# Patient Record
Sex: FEMALE | Race: BLACK OR AFRICAN AMERICAN | HISPANIC OR LATINO
[De-identification: names, ages, dates, MRNs, and addresses within clinical notes are randomized per-mention and may not be internally consistent; named-entity substitution may affect disease eponyms.]

---

## 2019-09-06 ENCOUNTER — APPOINTMENT (OUTPATIENT)
Dept: ELECTROPHYSIOLOGY | Facility: CLINIC | Age: 59
End: 2019-09-06
Payer: MEDICAID

## 2019-09-06 VITALS
BODY MASS INDEX: 33.42 KG/M2 | OXYGEN SATURATION: 98 % | SYSTOLIC BLOOD PRESSURE: 122 MMHG | HEART RATE: 80 BPM | WEIGHT: 203 LBS | DIASTOLIC BLOOD PRESSURE: 80 MMHG | HEIGHT: 65.5 IN

## 2019-09-06 PROCEDURE — 93291 INTERROG DEV EVAL SCRMS IP: CPT

## 2019-09-06 RX ORDER — CHLORHEXIDINE GLUCONATE 4 %
400 (240 MG) LIQUID (ML) TOPICAL
Refills: 0 | Status: ACTIVE | COMMUNITY
Start: 2019-09-06

## 2019-09-06 RX ORDER — SENNA 8.6 MG/1
8.6 TABLET, FILM COATED ORAL
Refills: 0 | Status: ACTIVE | COMMUNITY
Start: 2019-09-06

## 2019-09-06 RX ORDER — RANITIDINE 150 MG/1
150 TABLET ORAL
Refills: 0 | Status: ACTIVE | COMMUNITY
Start: 2019-09-06

## 2019-09-06 RX ORDER — AMLODIPINE BESYLATE 5 MG/1
5 TABLET ORAL DAILY
Qty: 30 | Refills: 0 | Status: ACTIVE | COMMUNITY
Start: 2019-09-06

## 2019-09-06 RX ORDER — CLOPIDOGREL BISULFATE 75 MG/1
75 TABLET, FILM COATED ORAL DAILY
Qty: 90 | Refills: 3 | Status: ACTIVE | COMMUNITY
Start: 2019-09-06

## 2019-09-06 RX ORDER — ATORVASTATIN CALCIUM 20 MG/1
20 TABLET, FILM COATED ORAL
Qty: 30 | Refills: 0 | Status: ACTIVE | COMMUNITY
Start: 2019-09-06

## 2019-11-21 ENCOUNTER — APPOINTMENT (OUTPATIENT)
Dept: ELECTROPHYSIOLOGY | Facility: CLINIC | Age: 59
End: 2019-11-21
Payer: MEDICAID

## 2019-11-21 PROCEDURE — 93298 REM INTERROG DEV EVAL SCRMS: CPT

## 2019-11-21 PROCEDURE — 93299: CPT

## 2019-12-23 ENCOUNTER — APPOINTMENT (OUTPATIENT)
Dept: ELECTROPHYSIOLOGY | Facility: CLINIC | Age: 59
End: 2019-12-23
Payer: MEDICAID

## 2019-12-23 PROCEDURE — 93299: CPT

## 2019-12-23 PROCEDURE — 93298 REM INTERROG DEV EVAL SCRMS: CPT

## 2020-01-23 ENCOUNTER — APPOINTMENT (OUTPATIENT)
Dept: ELECTROPHYSIOLOGY | Facility: CLINIC | Age: 60
End: 2020-01-23
Payer: MEDICAID

## 2020-01-23 PROCEDURE — 93298 REM INTERROG DEV EVAL SCRMS: CPT

## 2020-01-23 PROCEDURE — G2066: CPT

## 2020-02-24 ENCOUNTER — APPOINTMENT (OUTPATIENT)
Dept: ELECTROPHYSIOLOGY | Facility: CLINIC | Age: 60
End: 2020-02-24
Payer: MEDICAID

## 2020-02-24 PROCEDURE — 93298 REM INTERROG DEV EVAL SCRMS: CPT

## 2020-02-24 PROCEDURE — G2066: CPT

## 2020-03-13 ENCOUNTER — APPOINTMENT (OUTPATIENT)
Dept: ELECTROPHYSIOLOGY | Facility: CLINIC | Age: 60
End: 2020-03-13

## 2020-03-27 ENCOUNTER — APPOINTMENT (OUTPATIENT)
Dept: ELECTROPHYSIOLOGY | Facility: CLINIC | Age: 60
End: 2020-03-27
Payer: MEDICAID

## 2020-03-27 PROCEDURE — 93298 REM INTERROG DEV EVAL SCRMS: CPT

## 2020-03-27 PROCEDURE — G2066: CPT

## 2020-04-13 ENCOUNTER — APPOINTMENT (OUTPATIENT)
Dept: ELECTROPHYSIOLOGY | Facility: CLINIC | Age: 60
End: 2020-04-13
Payer: MEDICAID

## 2020-04-13 PROCEDURE — 93298 REM INTERROG DEV EVAL SCRMS: CPT

## 2020-04-13 PROCEDURE — G2066: CPT

## 2020-04-27 ENCOUNTER — APPOINTMENT (OUTPATIENT)
Dept: ELECTROPHYSIOLOGY | Facility: CLINIC | Age: 60
End: 2020-04-27

## 2020-05-14 ENCOUNTER — APPOINTMENT (OUTPATIENT)
Dept: ELECTROPHYSIOLOGY | Facility: CLINIC | Age: 60
End: 2020-05-14
Payer: MEDICAID

## 2020-05-14 PROCEDURE — 93298 REM INTERROG DEV EVAL SCRMS: CPT

## 2020-05-14 PROCEDURE — G2066: CPT

## 2020-06-15 ENCOUNTER — APPOINTMENT (OUTPATIENT)
Dept: ELECTROPHYSIOLOGY | Facility: CLINIC | Age: 60
End: 2020-06-15
Payer: MEDICAID

## 2020-06-15 PROCEDURE — G2066: CPT

## 2020-06-15 PROCEDURE — 93298 REM INTERROG DEV EVAL SCRMS: CPT

## 2020-07-20 ENCOUNTER — APPOINTMENT (OUTPATIENT)
Dept: ELECTROPHYSIOLOGY | Facility: CLINIC | Age: 60
End: 2020-07-20
Payer: MEDICAID

## 2020-07-20 PROCEDURE — G2066: CPT

## 2020-07-20 PROCEDURE — 93298 REM INTERROG DEV EVAL SCRMS: CPT

## 2020-09-03 ENCOUNTER — FORM ENCOUNTER (OUTPATIENT)
Age: 60
End: 2020-09-03

## 2020-09-16 ENCOUNTER — APPOINTMENT (OUTPATIENT)
Dept: ELECTROPHYSIOLOGY | Facility: CLINIC | Age: 60
End: 2020-09-16

## 2020-09-29 ENCOUNTER — APPOINTMENT (OUTPATIENT)
Dept: ELECTROPHYSIOLOGY | Facility: CLINIC | Age: 60
End: 2020-09-29
Payer: MEDICAID

## 2020-09-29 PROCEDURE — 93298 REM INTERROG DEV EVAL SCRMS: CPT

## 2020-09-29 PROCEDURE — G2066: CPT

## 2020-11-03 ENCOUNTER — APPOINTMENT (OUTPATIENT)
Dept: ELECTROPHYSIOLOGY | Facility: CLINIC | Age: 60
End: 2020-11-03
Payer: MEDICAID

## 2020-11-03 PROCEDURE — 93298 REM INTERROG DEV EVAL SCRMS: CPT

## 2020-11-03 PROCEDURE — G2066: CPT

## 2020-12-08 ENCOUNTER — APPOINTMENT (OUTPATIENT)
Dept: ELECTROPHYSIOLOGY | Facility: CLINIC | Age: 60
End: 2020-12-08
Payer: MEDICAID

## 2020-12-08 PROCEDURE — 93298 REM INTERROG DEV EVAL SCRMS: CPT

## 2020-12-08 PROCEDURE — G2066: CPT

## 2021-01-12 ENCOUNTER — APPOINTMENT (OUTPATIENT)
Dept: ELECTROPHYSIOLOGY | Facility: CLINIC | Age: 61
End: 2021-01-12
Payer: MEDICAID

## 2021-01-12 PROCEDURE — G2066: CPT

## 2021-01-12 PROCEDURE — 93298 REM INTERROG DEV EVAL SCRMS: CPT

## 2021-02-16 ENCOUNTER — APPOINTMENT (OUTPATIENT)
Dept: ELECTROPHYSIOLOGY | Facility: CLINIC | Age: 61
End: 2021-02-16
Payer: MEDICAID

## 2021-02-16 PROCEDURE — G2066: CPT

## 2021-02-16 PROCEDURE — 93298 REM INTERROG DEV EVAL SCRMS: CPT

## 2021-03-04 ENCOUNTER — NON-APPOINTMENT (OUTPATIENT)
Age: 61
End: 2021-03-04

## 2021-03-18 ENCOUNTER — NON-APPOINTMENT (OUTPATIENT)
Age: 61
End: 2021-03-18

## 2021-03-21 DIAGNOSIS — Z01.818 ENCOUNTER FOR OTHER PREPROCEDURAL EXAMINATION: ICD-10-CM

## 2021-03-22 ENCOUNTER — APPOINTMENT (OUTPATIENT)
Dept: DISASTER EMERGENCY | Facility: CLINIC | Age: 61
End: 2021-03-22

## 2021-03-23 ENCOUNTER — APPOINTMENT (OUTPATIENT)
Dept: ELECTROPHYSIOLOGY | Facility: CLINIC | Age: 61
End: 2021-03-23
Payer: MEDICAID

## 2021-03-23 ENCOUNTER — NON-APPOINTMENT (OUTPATIENT)
Age: 61
End: 2021-03-23

## 2021-03-23 LAB — SARS-COV-2 N GENE NPH QL NAA+PROBE: NOT DETECTED

## 2021-03-23 PROCEDURE — 93298 REM INTERROG DEV EVAL SCRMS: CPT

## 2021-03-23 PROCEDURE — G2066: CPT

## 2021-03-25 ENCOUNTER — OUTPATIENT (OUTPATIENT)
Dept: OUTPATIENT SERVICES | Facility: HOSPITAL | Age: 61
LOS: 1 days | End: 2021-03-25
Payer: MEDICAID

## 2021-03-25 VITALS
OXYGEN SATURATION: 100 % | RESPIRATION RATE: 16 BRPM | SYSTOLIC BLOOD PRESSURE: 136 MMHG | DIASTOLIC BLOOD PRESSURE: 79 MMHG | HEART RATE: 68 BPM

## 2021-03-25 VITALS
DIASTOLIC BLOOD PRESSURE: 78 MMHG | HEART RATE: 73 BPM | TEMPERATURE: 98 F | SYSTOLIC BLOOD PRESSURE: 136 MMHG | OXYGEN SATURATION: 100 % | RESPIRATION RATE: 18 BRPM

## 2021-03-25 DIAGNOSIS — I63.9 CEREBRAL INFARCTION, UNSPECIFIED: ICD-10-CM

## 2021-03-25 PROCEDURE — 33286 RMVL SUBQ CAR RHYTHM MNTR: CPT

## 2021-03-25 RX ORDER — MAGNESIUM OXIDE 400 MG ORAL TABLET 241.3 MG
1 TABLET ORAL
Qty: 0 | Refills: 0 | DISCHARGE

## 2021-03-25 RX ORDER — ALBUTEROL 90 UG/1
2 AEROSOL, METERED ORAL
Qty: 0 | Refills: 0 | DISCHARGE

## 2021-03-25 RX ORDER — AMLODIPINE BESYLATE 2.5 MG/1
1 TABLET ORAL
Qty: 0 | Refills: 0 | DISCHARGE

## 2021-03-25 RX ORDER — CLOPIDOGREL BISULFATE 75 MG/1
1 TABLET, FILM COATED ORAL
Qty: 0 | Refills: 0 | DISCHARGE

## 2021-03-25 RX ORDER — DIPHENHYDRAMINE HCL 50 MG
1 CAPSULE ORAL
Qty: 0 | Refills: 0 | DISCHARGE

## 2021-03-25 RX ORDER — SENNA PLUS 8.6 MG/1
1 TABLET ORAL
Qty: 0 | Refills: 0 | DISCHARGE

## 2021-03-25 NOTE — ASU DISCHARGE PLAN (ADULT/PEDIATRIC) - CARE PROVIDER_API CALL
ONDINA MALDONADO   Internal Medicine  [address] [place] [address]  Phone: [phone]  Fax: [phone]  Established Patient  Follow Up Time: Routine

## 2021-03-25 NOTE — ASU DISCHARGE PLAN (ADULT/PEDIATRIC) - ASU DC SPECIAL INSTRUCTIONSFT
WOUND CARE:  Do NOT scrub, rub, or pick at your incision site  the glue will wear off in 5-7 days  do not get your incision wet for 3 days   AFTER 3 days you may shower:   - use mild soap and gentle warm stream, pat dry with towel  DO NOT apply lotions, powders, ointment, or perfumes to incision  wear lose clothing around incision for 7 days  f/u appointment as instructed     ACTIVITY:   resume normal activities    Follow heart healthy diet recommended by your doctor,  if you smoke STOP SMOKING ( may call [phone] for center of tobacco control if you need assistance)     ***CALL YOUR DOCTOR***  if you experience: fever, chills, body aches, or severe pain, swelling, redness, heat or yellow discharge at incision site  If you are unable to reach your doctor, you may contact Cardiology Office at [facility] at [phone]

## 2021-03-26 ENCOUNTER — NON-APPOINTMENT (OUTPATIENT)
Age: 61
End: 2021-03-26

## 2021-04-07 ENCOUNTER — APPOINTMENT (OUTPATIENT)
Dept: ELECTROPHYSIOLOGY | Facility: CLINIC | Age: 61
End: 2021-04-07

## 2021-04-26 ENCOUNTER — APPOINTMENT (OUTPATIENT)
Dept: ELECTROPHYSIOLOGY | Facility: CLINIC | Age: 61
End: 2021-04-26

## 2021-06-23 ENCOUNTER — FORM ENCOUNTER (OUTPATIENT)
Age: 61
End: 2021-06-23

## 2021-09-29 ENCOUNTER — FORM ENCOUNTER (OUTPATIENT)
Age: 61
End: 2021-09-29

## 2021-10-19 ENCOUNTER — APPOINTMENT (OUTPATIENT)
Dept: ELECTROPHYSIOLOGY | Facility: CLINIC | Age: 61
End: 2021-10-19